# Patient Record
Sex: FEMALE | Race: WHITE | Employment: FULL TIME
[De-identification: names, ages, dates, MRNs, and addresses within clinical notes are randomized per-mention and may not be internally consistent; named-entity substitution may affect disease eponyms.]

---

## 2017-02-07 ENCOUNTER — OFFICE VISIT (OUTPATIENT)
Dept: OBGYN | Facility: CLINIC | Age: 22
End: 2017-02-07

## 2017-02-07 ENCOUNTER — HOSPITAL ENCOUNTER (OUTPATIENT)
Age: 22
Setting detail: SPECIMEN
Discharge: HOME | End: 2017-02-07
Payer: COMMERCIAL

## 2017-02-07 VITALS
WEIGHT: 127.5 LBS | HEART RATE: 77 BPM | RESPIRATION RATE: 16 BRPM | HEIGHT: 62 IN | SYSTOLIC BLOOD PRESSURE: 129 MMHG | DIASTOLIC BLOOD PRESSURE: 90 MMHG | BODY MASS INDEX: 23.46 KG/M2

## 2017-02-07 DIAGNOSIS — Z01.419 ENCOUNTER FOR GYNECOLOGICAL EXAMINATION WITHOUT ABNORMAL FINDING: Primary | ICD-10-CM

## 2017-02-07 PROCEDURE — 99395 PREV VISIT EST AGE 18-39: CPT | Performed by: OBSTETRICS & GYNECOLOGY

## 2017-02-17 LAB — CYTOLOGY REPORT: NORMAL

## 2017-07-17 ENCOUNTER — E-VISIT (OUTPATIENT)
Dept: FAMILY MEDICINE CLINIC | Facility: CLINIC | Age: 22
End: 2017-07-17

## 2017-07-17 DIAGNOSIS — B96.89 ACUTE BACTERIAL SINUSITIS: Primary | ICD-10-CM

## 2017-07-17 DIAGNOSIS — J01.90 ACUTE BACTERIAL SINUSITIS: Primary | ICD-10-CM

## 2017-07-17 ASSESSMENT — LIFESTYLE VARIABLES: SMOKING_STATUS: NO

## 2017-07-19 RX ORDER — AMOXICILLIN 875 MG/1
875 TABLET, COATED ORAL 2 TIMES DAILY
Qty: 20 TABLET | Refills: 0 | Status: SHIPPED | OUTPATIENT
Start: 2017-07-19 | End: 2017-07-29

## 2017-10-24 ENCOUNTER — OFFICE VISIT (OUTPATIENT)
Dept: FAMILY MEDICINE CLINIC | Age: 22
End: 2017-10-24
Payer: COMMERCIAL

## 2017-10-24 VITALS
HEIGHT: 62 IN | BODY MASS INDEX: 23 KG/M2 | DIASTOLIC BLOOD PRESSURE: 62 MMHG | SYSTOLIC BLOOD PRESSURE: 112 MMHG | WEIGHT: 125 LBS | HEART RATE: 78 BPM

## 2017-10-24 DIAGNOSIS — Z00.00 WELL ADULT EXAM: Primary | ICD-10-CM

## 2017-10-24 PROCEDURE — 99395 PREV VISIT EST AGE 18-39: CPT | Performed by: FAMILY MEDICINE

## 2017-10-24 PROCEDURE — 86580 TB INTRADERMAL TEST: CPT | Performed by: FAMILY MEDICINE

## 2017-10-24 ASSESSMENT — PATIENT HEALTH QUESTIONNAIRE - PHQ9
1. LITTLE INTEREST OR PLEASURE IN DOING THINGS: 0
SUM OF ALL RESPONSES TO PHQ9 QUESTIONS 1 & 2: 0
2. FEELING DOWN, DEPRESSED OR HOPELESS: 0
SUM OF ALL RESPONSES TO PHQ QUESTIONS 1-9: 0

## 2017-10-24 NOTE — PROGRESS NOTES
CHIEF COMPLAINT  Chief Complaint   Patient presents with    Annual Exam       SUBJECTIVE  Khanh Gutierrez is a 23 y.o. female who presents for pex for pharmacy    Doing well      Using minmal zantac    Has some allergy issues. Is not consistent with the.    utd with gyne    occ has some allergy issues - minor    Review of Systems:  · General: no significant weight changes. · Skin: no abnormal pigmentation, rash, or itching. · Eyes: no blurring, diplopia, or eye pain. wears corrective lenses. · Ears/Nose/Throat: no hearing loss, tinnitus, vertigo on a regular basis; no chronic nosebleed; no severe chronic sore throat or hoarseness. · Respiratory: no recurrent cough,  dyspnea, or wheezing  · Cardiovascular: no recurrent pain, DELGADO, orthopnea, palpitations, or claudication  · Gastrointestinal: no chronic nausea, vomiting, , diarrhea, constipation, bloating, or abdominal pain. No bloody or black stools. · Genitourinary: no urinary urgency, frequency, dysuria, nocturia, hesitancy, incontinence, or pain. No hematuria  ·   · Hematologic/Lymphatic/Immunologic: no abnormal bleeding/bruising; No recurrent fever, chills, night sweats orswollen glands.    International Travel to 3rd world countries? no  Exposure to TB, Hep A,B,or C, or other communicable diseases?no        PAST MEDICAL HISTORY    Past Medical History:   Diagnosis Date    GERD (gastroesophageal reflux disease) 01/2014    Migraine 2009    hospitalized for 3 days for workup       SURGICAL HISTORY    Past Surgical History:   Procedure Laterality Date    COLONOSCOPY  9/19/2014    FINGER FRACTURE SURGERY Left 2012    pin in the left index finger    TYMPANOSTOMY TUBE PLACEMENT  1994-2002    x3 on the right, x2 on the left    UPPER GASTROINTESTINAL ENDOSCOPY  04/22/2014    UPPER GASTROINTESTINAL ENDOSCOPY  9/19/2014    with Bravo       FAMILY HISTORY    Family History   Problem Relation Age of Onset    Cancer Maternal Grandmother      breast cancer, UTERINE, LUNG, THROAT    Cancer Maternal Aunt 40     breast cancer    Diabetes Brother      type 1       SOCIAL HISTORY    Social History     Social History    Marital status: Single     Spouse name: N/A    Number of children: N/A    Years of education: 15     Occupational History    student      Social History Main Topics    Smoking status: Never Smoker    Smokeless tobacco: Never Used    Alcohol use 0.6 oz/week     1 Glasses of wine per week    Drug use: No    Sexual activity: Yes     Partners: Male     Other Topics Concern    None     Social History Narrative    In school studying pharmacyCaffeine - diet, drinks 1-2 q dayExercise - trys to work [employer] - parents with bro and a dog        Diet -  trys to watch    Caffeine intake per day - only a few times a week    Exercise pattern - no    Living situation - home, with parents, bro and one dog           MEDICATIONS  Current Outpatient Prescriptions   Medication Sig Dispense Refill    fluticasone (FLONASE) 50 MCG/ACT nasal spray 2 sprays by Nasal route daily (Patient taking differently: 2 sprays by Nasal route as needed ) 1 Bottle 11    CYCLAFEM 1/35 1-35 MG-MCG per tablet        No current facility-administered medications for this visit. ALLERGIES  No Known Allergies    PHYSICAL EXAM:   Physical Exam   Vitals: There were no vitals filed for this visit. Vitals:   Vitals:    10/24/17 0954   BP: 112/62   Pulse: 78   Weight: 125 lb (56.7 kg)   Height: 5' 2\" (1.575 m)     Wt Readings from Last 3 Encounters:   10/24/17 125 lb (56.7 kg)   02/07/17 127 lb 8 oz (57.8 kg)   10/04/16 123 lb (55.8 kg)     Ht Readings from Last 3 Encounters:   10/24/17 5' 2\" (1.575 m)   02/07/17 5' 2\" (1.575 m)   10/04/16 5' 2\" (1.575 m)     Body mass index is 22.86 kg/m². Constitutional: She is oriented to person, place, and time. She appears well-developed and well-nourished and in no acute distress. Answers all my questions appropriately.    Head: Normocephalic and atraumatic. Eyes:  Conjunctiva appear normal.  Right Ear: External ear normal. TM is clear  Left Ear: External ear normal. TM is clear  Nose: pink, non-edematous mucosa. No polyps. No septal deviation  Throat: no erythema, tonsillar hypertrophy or exudate. No ulcerations noted. Lips/Teeth/Gums all appear normal.  Neck: Normal range of motion. Neck supple. No tracheal deviation present. No abnormal lymphadenopathy. No JVD noted. Carotids are clear bilaterally. No thyroid masses noted. Heart: RRR without murmur. No S3, S4, or gallop noted. Chest: Clear to auscultation bilaterally. Good breath sounds noted. No rales, wheezes, or rhonchi noted. No respiratory retractions noted. Wall has symmetrical movement with respirations. Abdomen: No distension noted.  + bowel sounds in all quadrants which are normoactive. No bruits noted. No masses could be palpated. No unusual pulsatile masses noted. To deep palpation, patient denied any significant pain. No rebound, guarding or rigidity noted to my exam.   Musculoskeletal: Normal gait and station  Extremities: no clubbing, cyanosis, erythema, muscle atrophy, deformity or edema noted. Skin: Skin is warm and dry. No obvious lesion on exposed skin. Psychiatric: mood appears euthymic, affect appears normal.        ASSESSMENT       Encounter Diagnosis   Name Primary?     Well adult exam Yes         PLAN  Orders Placed This Encounter   Procedures    Mantoux testing     [id number] Cris Simpson for [employer]

## 2018-01-18 ENCOUNTER — OFFICE VISIT (OUTPATIENT)
Dept: FAMILY MEDICINE CLINIC | Age: 23
End: 2018-01-18
Payer: COMMERCIAL

## 2018-01-18 VITALS
WEIGHT: 127 LBS | HEART RATE: 81 BPM | OXYGEN SATURATION: 98 % | DIASTOLIC BLOOD PRESSURE: 70 MMHG | BODY MASS INDEX: 23.23 KG/M2 | SYSTOLIC BLOOD PRESSURE: 104 MMHG

## 2018-01-18 DIAGNOSIS — L42 PITYRIASIS ROSEA: Primary | ICD-10-CM

## 2018-01-18 PROCEDURE — 99213 OFFICE O/P EST LOW 20 MIN: CPT | Performed by: FAMILY MEDICINE

## 2018-01-18 NOTE — PROGRESS NOTES
Subjective:  Ulysses Dodge presents for   Chief Complaint   Patient presents with    Rash     started on back and has spread to her abdomen and chest. itching is mild. has applied hydrocortisone and has taken allegra     Noticed 10 days ago. Has spread on th front side now. Not itchy. Tried allegra and otc steroids. 2 weeks ago started clotrimazole  For 'burning mouth syndrome'    This does not itch    Showers every other day. Uses dial soap    Patient Active Problem List   Diagnosis    Migraine aura without headache    Chronic intermittent headache    GERD (gastroesophageal reflux disease)    Environmental allergies    IBS (irritable bowel syndrome)       Review of Systems:  · General: no significant weight changes. · Respiratory: no cough, pleuritic chest pain, dyspnea, or wheezing  · Cardiovascular: no pain, DELGADO, orthopnea, palpitations, or claudication  · Gastrointestinal: no chronic nausea, vomiting, heartburn, diarrhea, constipation, bloating, or abdominal pain. No bloody or black stools. Objective:  Physical Exam   Vitals:   Vitals:    01/18/18 1037   BP: 104/70   Pulse: 81   SpO2: 98%   Weight: 127 lb (57.6 kg)     Wt Readings from Last 3 Encounters:   01/18/18 127 lb (57.6 kg)   10/24/17 125 lb (56.7 kg)   02/07/17 127 lb 8 oz (57.8 kg)     Ht Readings from Last 3 Encounters:   10/24/17 5' 2\" (1.575 m)   02/07/17 5' 2\" (1.575 m)   10/04/16 5' 2\" (1.575 m)     Body mass index is 23.23 kg/m². Constitutional: She is oriented to person, place, and time. She appears well-developed and well-nourished and in no acute distress. Answers all my questions appropriately. Head: Normocephalic and atraumatic. Skin - on her back at the level of her bra strap is a 3x2 cm red scaly herald patch. Few smaller lesion on back, but the majority are across her belly and upper torso      Assessment:   Encounter Diagnosis   Name Primary?  Pityriasis rosea Yes         Plan:   Discussed the dx.   Doubt this is a drug eruption. Sx tx. Handout given. Reassured her this is not evidence of any significant disease    Patient Instructions       Patient Education        Pityriasis Rosea: Care Instructions  Your Care Instructions    Pityriasis rosea (say \"pit-uh-RY-uh-onel JOHN-zee-uh\") is a common skin rash. It usually starts as one scaly, reddish-pink spot on your stomach or back. Days or weeks later, more spots appear. The rash may itch, but it will not spread to other people. No one knows what causes pityriasis rosea. Some doctors believe it is a reaction to a virus. Pityriasis rosea is most common in children and young adults. It lasts 1 to 3 months and then goes away on its own. Medicine can help relieve any itching. Follow-up care is a key part of your treatment and safety. Be sure to make and go to all appointments, and call your doctor if you are having problems. It's also a good idea to know your test results and keep a list of the medicines you take. How can you care for yourself at home? · Use your medicine exactly as prescribed. Call your doctor if you have any problems with your medicine. · Expose your skin to small amounts of sunlight, but avoid sunburn. Sunlight can lessen the rash. · Use a mild soap, such as Dove or Cetaphil, when you wash your skin. · Add a handful of oatmeal (ground to a powder) to your bath. Or you can try an oatmeal bath product, such as Aveeno. Keep the water warm or lukewarm. A hot bath or shower may make the rash more visible and itchy. · Use an over-the-counter 1% hydrocortisone cream for small itchy areas. When should you call for help? Call your doctor now or seek immediate medical care if:  ? · You have signs of infection such as:  ¨ Pain, warmth, or swelling near the rash. ¨ Red streaks near the rash. ¨ Pus coming from the rash. ¨ A fever. ? Watch closely for changes in your health, and be sure to contact your doctor if:  ? · You see the rash on the palms of your hands or the soles of your feet. ? · You do not get better as expected. Where can you learn more? Go to [email or web address] and sign in to your [email or web address] account. Enter S327 in the [facility] box to learn more about \"Pityriasis Rosea: Care Instructions. \"     If you do not have an account, please click on the \"Sign Up Now\" link. Current as of: October 13, 2016  Content Version: 11.5  © 9896-1759 Healthwise, Inc[employer]. Care instructions adapted under license by [facility] ([facility]). If you have questions about a medical condition or this instruction, always ask your healthcare professional. [address] any warranty or liability for your use of this information.

## 2018-01-18 NOTE — PATIENT INSTRUCTIONS
[email or web address] and sign in to your [employer] account. Enter S327 in the [place] box to learn more about \"Pityriasis Rosea: Care Instructions. \"     If you do not have an account, please click on the \"Sign Up Now\" link. Current as of: October 13, 2016  Content Version: 11.5  © 3052-1191 Healthwise, [employer]. Care instructions adapted under license by [facility] [address] If you have questions about a medical condition or this instruction, always ask your healthcare professional. [address] any warranty or liability for your use of this information.

## 2018-02-28 ENCOUNTER — OFFICE VISIT (OUTPATIENT)
Dept: OBGYN CLINIC | Age: 23
End: 2018-02-28
Payer: COMMERCIAL

## 2018-02-28 ENCOUNTER — HOSPITAL ENCOUNTER (OUTPATIENT)
Age: 23
Setting detail: SPECIMEN
Discharge: HOME | End: 2018-02-28
Payer: COMMERCIAL

## 2018-02-28 VITALS
HEIGHT: 62 IN | WEIGHT: 127.4 LBS | BODY MASS INDEX: 23.45 KG/M2 | SYSTOLIC BLOOD PRESSURE: 128 MMHG | DIASTOLIC BLOOD PRESSURE: 82 MMHG | HEART RATE: 95 BPM

## 2018-02-28 DIAGNOSIS — Z01.419 WELL WOMAN EXAM WITH ROUTINE GYNECOLOGICAL EXAM: Primary | ICD-10-CM

## 2018-02-28 PROCEDURE — 99395 PREV VISIT EST AGE 18-39: CPT | Performed by: OBSTETRICS & GYNECOLOGY

## 2018-02-28 NOTE — PROGRESS NOTES
Dania Dodd  2/28/2018              23 y.o. Chief Complaint   Patient presents with    Gynecologic Exam              No referring provider defined for this encounter. The patient was seen and examined. She has no chief complaint today and is here for her annual exam.  Her bowels are regular. There are no voiding complaints. She denies any bloating. She denies vaginal discharge     HPI : 15yo G0 for annual exam, needs a refill on her OCP.  ________________________________________________________________________    Past Medical History:   Diagnosis Date    GERD (gastroesophageal reflux disease) 01/2014    Migraine 2009    hospitalized for 3 days for workup                                                                   Past Surgical History:   Procedure Laterality Date    COLONOSCOPY  9/19/2014    FINGER FRACTURE SURGERY Left 2012    pin in the left index finger    TYMPANOSTOMY TUBE PLACEMENT  1994-2002    x3 on the right, x2 on the left    UPPER GASTROINTESTINAL ENDOSCOPY  04/22/2014    UPPER GASTROINTESTINAL ENDOSCOPY  9/19/2014    with Bravo     Family History   Problem Relation Age of Onset    Cancer Maternal Grandmother      breast cancer, UTERINE, LUNG, THROAT    Cancer Maternal Aunt 40     breast cancer    Diabetes Brother      type 1     History   Smoking Status    Never Smoker   Smokeless Tobacco    Never Used     History   Alcohol Use    0.6 oz/week    1 Glasses of wine per week       MEDICATIONS:  Current Outpatient Prescriptions   Medication Sig Dispense Refill    norethindrone-ethinyl estradiol (CYCLAFEM 1/35) 1-35 MG-MCG per tablet Take 1 tablet by mouth daily 3 packet 4    fluticasone (FLONASE) 50 MCG/ACT nasal spray 2 sprays by Nasal route daily (Patient taking differently: 2 sprays by Nasal route as needed ) 1 Bottle 11     No current facility-administered medications for this visit. ALLERGIES:  Patient has no known allergies.   Immunization status: stated as current, but no records available. Gynecologic History:     Patient's last menstrual period was 01/31/2018. Sexually Active: Yes    STD History: No     Permanent Sterilization: No   Reversible Birth Control: No        Hormone Replacement Exposure: No      Family History of Breast, Ovarian , Colon or Uterine Cancer: Yes breast   If YES see scanned worksheet. Preventative Health Testing:  Date of Last Pap Smear: 2017  Abnormal Pap Smear History:   Colposcopy History:   Date of Last Mammogram: na  Date of Last Colonoscopy:   Date of Last Bone Density:      ________________________________________________________________________  REVIEW OF SYSTEMS:        A minimum of an eleven point review of systems was completed and found to be negative except for the pertinent positives found below. Constitutional: No fever, chills or malaise; No weight change or fatigue  Head and Eyes: No vision, Headache, Dizziness or trauma in last 12 months  ENT ROS: No hearing, Tinnitis, sinus or taste problems  Hematological and Lymphatic ROS:No Lymphoma, Von Willebrand's, Hemophillia or Bleeding History  Psych ROS: No Depression, Homicidal thoughts,suicidal thoughts, or anxiety  Breast ROS: No prior breast abnormalities or lumps  Respiratory ROS: No SOB, Pneumoniae,Cough, or Pulmonary Embolism History  Cardiovascular ROS: No Chest Pain with Exertion, Palpitations, Syncope, Edema, Arrhythmia  Gastrointestinal ROS: No Indigestion, Heartburn, Nausea, vomiting, Diahrea, Constipation,or Bowel Changes; No Bloody Stools or melena  Genito-Urinary ROS: No Dysuria, Hematuria or Nocturia.  No Urinary Incontinence or Vaginal Discharge  Musculoskeletal ROS: No Arthralgia, Arthritis,Gout,Osteoporosis or Rheumatism  Neurological ROS: No CVA, Migraines, Epilepsy, Seizure Hx, or Limb Weakness  Dermatological ROS: No Rash, Itching, Hives, Mole Changes or Cancer

## 2018-03-09 LAB — CYTOLOGY REPORT: NORMAL

## 2018-04-04 ENCOUNTER — OFFICE VISIT (OUTPATIENT)
Dept: FAMILY MEDICINE CLINIC | Age: 23
End: 2018-04-04
Payer: COMMERCIAL

## 2018-04-04 ENCOUNTER — HOSPITAL ENCOUNTER (OUTPATIENT)
Age: 23
Setting detail: SPECIMEN
Discharge: HOME | End: 2018-04-04
Payer: COMMERCIAL

## 2018-04-04 VITALS
HEART RATE: 80 BPM | BODY MASS INDEX: 23.48 KG/M2 | SYSTOLIC BLOOD PRESSURE: 100 MMHG | WEIGHT: 128.38 LBS | DIASTOLIC BLOOD PRESSURE: 70 MMHG | OXYGEN SATURATION: 99 %

## 2018-04-04 DIAGNOSIS — T75.3XXS SEA SICKNESS, SEQUELA: ICD-10-CM

## 2018-04-04 DIAGNOSIS — R35.0 URINARY FREQUENCY: Primary | ICD-10-CM

## 2018-04-04 LAB
BILIRUBIN, POC: ABNORMAL
BLOOD URINE, POC: ABNORMAL
CLARITY, POC: CLEAR
COLOR, POC: YELLOW
GLUCOSE URINE, POC: ABNORMAL
KETONES, POC: ABNORMAL
LEUKOCYTE EST, POC: ABNORMAL
NITRITE, POC: ABNORMAL
PH, POC: 7.5
PROTEIN, POC: ABNORMAL
SPECIFIC GRAVITY, POC: 1.01
UROBILINOGEN, POC: 0.2

## 2018-04-04 PROCEDURE — 81003 URINALYSIS AUTO W/O SCOPE: CPT | Performed by: FAMILY MEDICINE

## 2018-04-04 PROCEDURE — 99213 OFFICE O/P EST LOW 20 MIN: CPT | Performed by: FAMILY MEDICINE

## 2018-04-04 RX ORDER — PHENAZOPYRIDINE HYDROCHLORIDE 200 MG/1
200 TABLET, FILM COATED ORAL 3 TIMES DAILY PRN
Qty: 9 TABLET | Refills: 0 | Status: SHIPPED | OUTPATIENT
Start: 2018-04-04 | End: 2018-04-07

## 2018-04-04 RX ORDER — SULFAMETHOXAZOLE AND TRIMETHOPRIM 800; 160 MG/1; MG/1
1 TABLET ORAL 2 TIMES DAILY
Qty: 10 TABLET | Refills: 0 | Status: SHIPPED | OUTPATIENT
Start: 2018-04-04 | End: 2018-04-09

## 2018-04-04 RX ORDER — SCOLOPAMINE TRANSDERMAL SYSTEM 1 MG/1
1 PATCH, EXTENDED RELEASE TRANSDERMAL
Qty: 4 PATCH | Refills: 0 | Status: SHIPPED | OUTPATIENT
Start: 2018-04-04

## 2018-04-05 LAB
CULTURE: NORMAL
CULTURE: NORMAL
Lab: NORMAL
SPECIMEN DESCRIPTION: NORMAL
STATUS: NORMAL